# Patient Record
Sex: MALE | Race: WHITE | URBAN - METROPOLITAN AREA
[De-identification: names, ages, dates, MRNs, and addresses within clinical notes are randomized per-mention and may not be internally consistent; named-entity substitution may affect disease eponyms.]

---

## 2017-10-08 ENCOUNTER — EMERGENCY (EMERGENCY)
Facility: HOSPITAL | Age: 22
LOS: 1 days | Discharge: HOME | End: 2017-10-08
Attending: EMERGENCY MEDICINE | Admitting: EMERGENCY MEDICINE
Payer: COMMERCIAL

## 2017-10-08 VITALS
SYSTOLIC BLOOD PRESSURE: 112 MMHG | DIASTOLIC BLOOD PRESSURE: 73 MMHG | TEMPERATURE: 98 F | OXYGEN SATURATION: 97 % | RESPIRATION RATE: 18 BRPM | HEART RATE: 67 BPM

## 2017-10-08 PROCEDURE — 99283 EMERGENCY DEPT VISIT LOW MDM: CPT | Mod: 25

## 2017-10-08 PROCEDURE — 29105 APPLICATION LONG ARM SPLINT: CPT | Mod: RT

## 2017-10-08 PROCEDURE — 73080 X-RAY EXAM OF ELBOW: CPT

## 2017-10-08 PROCEDURE — 73080 X-RAY EXAM OF ELBOW: CPT | Mod: 26,LT

## 2017-10-08 PROCEDURE — 29105 APPLICATION LONG ARM SPLINT: CPT

## 2017-10-08 NOTE — ED PROVIDER NOTE - CARE PLAN
Instructions for follow-up, activity and diet:	Rest, leave splint in place until evaluated by orthopedist, keep clean and dry.  Use sling while walking.   Ice 10 minutes every 4 hours as needed for pain/swelling.  Elevate when seated  Take Motrin 600mg every 8hrs as needed for pain with food and plenty of water  Follow up with orthopedist tomorrow.  Referral list provided.  Return to ER for worsening pain, swelling, numbness, weakness, or if you have any new or changing symptoms or concerns Principal Discharge DX:	Fall, initial encounter  Instructions for follow-up, activity and diet:	Rest, leave splint in place until evaluated by orthopedist, keep clean and dry.  Use sling while walking.   Ice 10 minutes every 4 hours as needed for pain/swelling.  Elevate when seated  Take Motrin 600mg every 8hrs as needed for pain with food and plenty of water  Follow up with orthopedist tomorrow.  Referral list provided.  Return to ER for worsening pain, swelling, numbness, weakness, or if you have any new or changing symptoms or concerns

## 2017-10-08 NOTE — ED PROCEDURE NOTE - CPROC ED POST PROC CARE GUIDE1
Elevate the injured extremity as instructed./Verbal/written post procedure instructions were given to patient/caregiver./Keep the cast/splint/dressing clean and dry.

## 2017-10-08 NOTE — ED POST DISCHARGE NOTE - OTHER COMMUNICATION
ED noted posterior fat pad sign on xray despite negative read by radiology. patient was placed in a sugar tong splint and given orthopedic follow-up for the following day. -Montse Jacobs PA-C

## 2017-10-08 NOTE — ED PROVIDER NOTE - MEDICAL DECISION MAKING DETAILS
No obvious fracture on xray, posterior fat pad sign, will splint and send with ortho f/u tomorrow.  Megan Thomas, PGY3

## 2017-10-08 NOTE — ED PROVIDER NOTE - PLAN OF CARE
Rest, leave splint in place until evaluated by orthopedist, keep clean and dry.  Use sling while walking.   Ice 10 minutes every 4 hours as needed for pain/swelling.  Elevate when seated  Take Motrin 600mg every 8hrs as needed for pain with food and plenty of water  Follow up with orthopedist tomorrow.  Referral list provided.  Return to ER for worsening pain, swelling, numbness, weakness, or if you have any new or changing symptoms or concerns

## 2017-10-08 NOTE — ED POST DISCHARGE NOTE - RESULT SUMMARY
L elbow xray initially read by radiology as no acute fracture. final read showing L elbow joint effusion question avulsion fracture of L medial epicondyle.

## 2017-10-08 NOTE — ED PROVIDER NOTE - ATTENDING CONTRIBUTION TO CARE
I have seen and evaluated this patient with the resident.   I agree with the findings  unless other wise stated.  I have made appropriate changes in documentations where needed, After my face to face bedside evaluation, I am further  noting: mecahnical fall last night getting out of a car while under influence of alcohol haedache and elbow pain on cns eval non focal Elbow xray  No obvious fracture on xray, posterior fat pad sign, will splint and send with ortho f/u tomorrow. --Prem Thomas, PGY3

## 2017-10-08 NOTE — ED PROVIDER NOTE - OBJECTIVE STATEMENT
22M no PMH fall 6-7 hours ago, hit head, no LOC, landed unknown position on elbow.  Woke up this morning with difficulty with full extension/flexion.  No numbness/weakness. 22M no PMH fall 6-7 hours ago.  Tripped while getting out of [employer] after drinking, hit head, no LOC, landed unknown position on left arm.  Woke up this morning with difficulty with full extension/flexion.  No numbness/weakness, no skin discoloration.  Has pain with movement more than at rest.  Took 2 tylenol with slight improvement.  Last TDAP thinks was 4 years ago, does not want injection today

## 2017-10-08 NOTE — ED PROVIDER NOTE - MUSCULOSKELETAL, MLM
Tender to L olecranon and pain with full extension and flexion of elbow.  Shoulder, wrist, and hand not limited ROM.  Sensation and strength intact distal to injury, hand well perfused with +2 radial and ulnar pulses.

## 2017-10-08 NOTE — ED ADULT NURSE NOTE - CAS TRG GENERAL NORM CIRC DET
Strong peripheral pulses Capillary refill less/equal to 2 seconds/Strong peripheral pulses/No visible significant external bleeding

## 2017-10-08 NOTE — ED ADULT NURSE NOTE - OBJECTIVE STATEMENT
22 y.o. male presents ambulatory to ED through waiting room c/o arm pain s/p fall last night. Patient states he was getting out of car last night and foot got stuck in the car and his top half of his body fell forward out of the car, hit head and arms. Positive abrasion to R elbow, above L eye, swelling to L elbow. patient now states L elbow is swollen and painful. Took advil at home. Denies LOC, HA, neck/back pain, CP, SOB, abd pain, NVD. Denies any PMH/PSH. call bell in hand and side rails up with bed in lowest position for safety. blanket provided, vital signs stable, girlfriend at bedside. Comfort and safety provided.

## 2018-01-12 NOTE — ED PROVIDER NOTE - CHIEF COMPLAINT
The patient is a 22y Male complaining of pain, arm.
Coronary artery disease involving native coronary artery of native heart without angina pectoris